# Patient Record
Sex: FEMALE | Race: WHITE
[De-identification: names, ages, dates, MRNs, and addresses within clinical notes are randomized per-mention and may not be internally consistent; named-entity substitution may affect disease eponyms.]

---

## 2017-10-08 ENCOUNTER — HOSPITAL ENCOUNTER (EMERGENCY)
Dept: HOSPITAL 63 - ER | Age: 32
Discharge: HOME | End: 2017-10-08
Payer: COMMERCIAL

## 2017-10-08 VITALS — HEIGHT: 68 IN | WEIGHT: 135 LBS | BODY MASS INDEX: 20.46 KG/M2

## 2017-10-08 VITALS — SYSTOLIC BLOOD PRESSURE: 102 MMHG | DIASTOLIC BLOOD PRESSURE: 71 MMHG

## 2017-10-08 DIAGNOSIS — M32.9: ICD-10-CM

## 2017-10-08 DIAGNOSIS — M25.561: Primary | ICD-10-CM

## 2017-10-08 PROCEDURE — 99283 EMERGENCY DEPT VISIT LOW MDM: CPT

## 2017-10-08 NOTE — PHYS DOC
General


Chief Complaint:  KNEE INJURY


Stated Complaint:  KNEE INJURY


Time Seen by MD:  11:06


Source:  patient, family


Exam Limitations:  no limitations


Problems:  





History of Present Illness


Initial Comments


Patient is a 32-year-old female with history of lupus coming to the ED for 

right knee symptoms.


Patient states that she had a steroid injection in her right knee on Thursday 

for what sounds like patellofemoral tracking syndrome. She states she was told 

to seek medical treatment and if she had any "streaking." She says today she 

has increased redness and has 2 small marks on the skin extending laterally 

which could be abrasions she feels as streaking. She complains of increased 

tenderness at the injection site however has no discomfort with active and 

passive full range of motion of the right knee. No fever chills sweats or body 

aches.  Ambulates w/o limp, pt and her spouse are very concerned.  ED VS normal


Onset:  other (3 days ago)


Severity:  mild


Pain/Injury Location:  right knee


Method of Injury:  other


Modifying Factors:  improves with other


Allergies:  


Coded Allergies:  


     No Known Drug Allergies (Unverified , 10/8/17)





Past Medical History


Medical History:  other (SLE)


Surgical History:  no surgical history





Social History


Smoker:  non-smoker


Alcohol:  none


Drugs:  none





Review of Systems


Constitutional:  denies chills, denies diaphoresis, denies fever, denies malaise


Respiratory:  denies cough, denies shortness of breath


Cardiovascular:  denies chest pain, denies palpitations


Gastrointestinal:  denies nausea, denies vomiting


Musculoskeletal:  see HPI


Skin:  see HPI


Psychiatric/Neurological:  denies numbness, denies paresthesia, denies weakness





Physical Exam


General Appearance:  WD/WN, no apparent distress


Neck:  non-tender, full range of motion, supple


Cardiovascular/Respiratory:  normal peripheral pulses, no respiratory distress


Back:  no CVA tenderness, no vertebral tenderness


Knees:  right knee normal range of motion, right knee no evidence of injury, 

right knee other (mild prepatellar bursal swelling and tenderness, ligaments 

intact, injection site 0.3cm circular erythema with mild tenderness appears 

normal, two thin 1cm lines extending laterally no bony TTP)


Neurologic/Tendon:  normal sensation, normal motor functions, normal tendon 

functions, responds to pain, no evidence tendon injury


Psychiatric:  alert, oriented x 3


Skin:  warm/dry (R knee as above)





Departure


Time of Disposition:  12:09


Disposition:  01 HOME, SELF-CARE


Diagnosis:  right knee pain


Condition:  GOOD


Patient Instructions:  RICE - Routine Care for Injuries, Easy-to-Read





Additional Instructions:  


RICE, see handout.


As discussed we'll treat with doxycycline to poole off any possible infection 

and prednisone to decrease swelling.


Zofran ODT for nausea as needed.


Follow-up with your doctor in 3-5 days for recheck.


Return to ED with new or changing symptoms.











LINDSEY RODRIGUEZ DO Oct 8, 2017 12:11

## 2017-11-09 ENCOUNTER — HOSPITAL ENCOUNTER (INPATIENT)
Dept: HOSPITAL 63 - 1 SOUTH | Age: 32
LOS: 1 days | Discharge: ANOTHER HOSPITAL | DRG: 392 | End: 2017-11-10
Attending: FAMILY MEDICINE | Admitting: FAMILY MEDICINE
Payer: COMMERCIAL

## 2017-11-09 VITALS — WEIGHT: 143.02 LBS | BODY MASS INDEX: 21.67 KG/M2 | HEIGHT: 68 IN

## 2017-11-09 VITALS — SYSTOLIC BLOOD PRESSURE: 111 MMHG | DIASTOLIC BLOOD PRESSURE: 71 MMHG

## 2017-11-09 VITALS — SYSTOLIC BLOOD PRESSURE: 113 MMHG | DIASTOLIC BLOOD PRESSURE: 72 MMHG

## 2017-11-09 VITALS — SYSTOLIC BLOOD PRESSURE: 100 MMHG | DIASTOLIC BLOOD PRESSURE: 62 MMHG

## 2017-11-09 VITALS — DIASTOLIC BLOOD PRESSURE: 66 MMHG | SYSTOLIC BLOOD PRESSURE: 107 MMHG

## 2017-11-09 VITALS — DIASTOLIC BLOOD PRESSURE: 71 MMHG | SYSTOLIC BLOOD PRESSURE: 111 MMHG

## 2017-11-09 DIAGNOSIS — Z87.891: ICD-10-CM

## 2017-11-09 DIAGNOSIS — R31.0: ICD-10-CM

## 2017-11-09 DIAGNOSIS — K52.9: Primary | ICD-10-CM

## 2017-11-09 DIAGNOSIS — G57.93: ICD-10-CM

## 2017-11-09 DIAGNOSIS — K92.1: ICD-10-CM

## 2017-11-09 DIAGNOSIS — Z90.89: ICD-10-CM

## 2017-11-09 DIAGNOSIS — M32.8: ICD-10-CM

## 2017-11-09 LAB
ALBUMIN SERPL-MCNC: 3.6 G/DL (ref 3.4–5)
ALBUMIN/GLOB SERPL: 1.2 {RATIO} (ref 1–1.7)
ALP SERPL-CCNC: 74 U/L (ref 46–116)
ALT SERPL-CCNC: 26 U/L (ref 14–59)
ANION GAP SERPL CALC-SCNC: 7 MMOL/L (ref 6–14)
APTT PPP: YELLOW S
AST SERPL-CCNC: 17 U/L (ref 15–37)
BACTERIA #/AREA URNS HPF: (no result) /HPF
BASOPHILS # BLD AUTO: 0 X10^3/UL (ref 0–0.2)
BASOPHILS NFR BLD: 1 % (ref 0–3)
BILIRUB SERPL-MCNC: 0.3 MG/DL (ref 0.2–1)
BILIRUB UR QL STRIP: (no result)
BUN/CREAT SERPL: 13 (ref 6–20)
CA-I SERPL ISE-MCNC: 9 MG/DL (ref 7–20)
CALCIUM SERPL-MCNC: 8.4 MG/DL (ref 8.5–10.1)
CHLORIDE SERPL-SCNC: 107 MMOL/L (ref 98–107)
CK SERPL-CCNC: 61 U/L (ref 26–192)
CO2 SERPL-SCNC: 27 MMOL/L (ref 21–32)
CREAT SERPL-MCNC: 0.7 MG/DL (ref 0.6–1)
CRP SERPL-MCNC: 0.7 MG/L (ref 0–3.3)
EOSINOPHIL NFR BLD: 0.2 X10^3/UL (ref 0–0.7)
EOSINOPHIL NFR BLD: 4 % (ref 0–3)
ERYTHROCYTE [DISTWIDTH] IN BLOOD BY AUTOMATED COUNT: 14.1 % (ref 11.5–14.5)
ERYTHROCYTE [SEDIMENTATION RATE] IN BLOOD: 4 MM/H (ref 0–25)
FIBRINOGEN PPP-MCNC: (no result) MG/DL
GFR SERPLBLD BASED ON 1.73 SQ M-ARVRAT: 97 ML/MIN
GLOBULIN SER-MCNC: 2.9 G/DL (ref 2.2–3.8)
GLUCOSE SERPL-MCNC: 84 MG/DL (ref 70–99)
GLUCOSE UR STRIP-MCNC: (no result) MG/DL
HCT VFR BLD CALC: 35.2 % (ref 36–47)
HGB BLD-MCNC: 12 G/DL (ref 12–15.5)
LYMPHOCYTES # BLD: 1.3 X10^3/UL (ref 1–4.8)
LYMPHOCYTES NFR BLD AUTO: 32 % (ref 24–48)
MCH RBC QN AUTO: 30 PG (ref 25–35)
MCHC RBC AUTO-ENTMCNC: 34 G/DL (ref 31–37)
MCV RBC AUTO: 88 FL (ref 79–100)
MONO #: 0.5 X10^3/UL (ref 0–1.1)
MONOCYTES NFR BLD: 11 % (ref 0–9)
NEUT #: 2.2 X10^3UL (ref 1.8–7.7)
NEUTROPHILS NFR BLD AUTO: 52 % (ref 31–73)
NITRITE UR QL STRIP: (no result)
PLATELET # BLD AUTO: 199 X10^3/UL (ref 140–400)
POTASSIUM SERPL-SCNC: 3.8 MMOL/L (ref 3.5–5.1)
PROT SERPL-MCNC: 6.5 G/DL (ref 6.4–8.2)
RBC # BLD AUTO: 3.98 X10^6/UL (ref 3.5–5.4)
RBC #/AREA URNS HPF: (no result) /HPF (ref 0–2)
SODIUM SERPL-SCNC: 141 MMOL/L (ref 136–145)
SP GR UR STRIP: 1.01
SQUAMOUS #/AREA URNS LPF: (no result) /LPF
U PREG PATIENT: NEGATIVE
UROBILINOGEN UR-MCNC: 0.2 MG/DL
WBC # BLD AUTO: 4.2 X10^3/UL (ref 4–11)
WBC #/AREA URNS HPF: (no result) /HPF (ref 0–4)

## 2017-11-09 PROCEDURE — 80053 COMPREHEN METABOLIC PANEL: CPT

## 2017-11-09 PROCEDURE — 85025 COMPLETE CBC W/AUTO DIFF WBC: CPT

## 2017-11-09 PROCEDURE — 85651 RBC SED RATE NONAUTOMATED: CPT

## 2017-11-09 PROCEDURE — 36415 COLL VENOUS BLD VENIPUNCTURE: CPT

## 2017-11-09 PROCEDURE — 81001 URINALYSIS AUTO W/SCOPE: CPT

## 2017-11-09 PROCEDURE — 83605 ASSAY OF LACTIC ACID: CPT

## 2017-11-09 PROCEDURE — 82550 ASSAY OF CK (CPK): CPT

## 2017-11-09 PROCEDURE — 74176 CT ABD & PELVIS W/O CONTRAST: CPT

## 2017-11-09 PROCEDURE — 81025 URINE PREGNANCY TEST: CPT

## 2017-11-09 PROCEDURE — 86140 C-REACTIVE PROTEIN: CPT

## 2017-11-09 PROCEDURE — 76770 US EXAM ABDO BACK WALL COMP: CPT

## 2017-11-09 PROCEDURE — 87040 BLOOD CULTURE FOR BACTERIA: CPT

## 2017-11-09 PROCEDURE — 87086 URINE CULTURE/COLONY COUNT: CPT

## 2017-11-09 RX ADMIN — HYDROXYCHLOROQUINE SULFATE SCH MG: 200 TABLET, FILM COATED ORAL at 20:58

## 2017-11-09 RX ADMIN — SODIUM CHLORIDE SCH MLS/HR: 0.9 INJECTION, SOLUTION INTRAVENOUS at 15:15

## 2017-11-09 RX ADMIN — ONDANSETRON PRN MG: 4 TABLET, ORALLY DISINTEGRATING ORAL at 16:47

## 2017-11-09 NOTE — RAD
EXAM: CT abdomen/pelvis without contrast.



HISTORY: Pyelonephritis, hematuria. Bloody stools. Abdominal pain.



TECHNIQUE: Computed tomography of the abdomen and pelvis was performed without

intravenous contrast.



COMPARISON: Today's ultrasound.



FINDINGS: Lung windows through the visualized portions of the bases reveal no

abnormality. Bone windows reveal no suspicious lesions.



The kidneys are unremarkable without contrast. There are no renal or ureteral

calculi. There is no hydronephrosis or perinephric stranding.



The gallbladder is surgically absent. The liver, spleen, adrenal glands, and

pancreas are unremarkable. There are no pathologically enlarged lymph nodes.



A small amount of free pelvic fluid is likely physiologic. The uterus and

ovaries are unremarkable by noncontrast CT. There is no clear bowel wall

thickening. The appendix is not inflamed.



IMPRESSION: 

1. No nephroureterolithiasis. No hydronephrosis.

2. No evidence of colitis by CT, though sensitivity is low.





*One or more of the following individualized dose reduction techniques were

utilized for this examination:  

1. Automated exposure control.  

2. Adjustment of the mA and/or kV according to patient size.  

3. Use of iterative reconstruction technique.

## 2017-11-09 NOTE — RAD
EXAM: Renal/retroperitonal ultrasound



HISTORY: Bilateral flank pain.



COMPARISON: Today's CT.



FINDINGS: Ultrasound of the kidneys, bladder and retroperitoneum was

performed.



The right kidney measures 10.6 cm. Cortical thickness and echogenicity are

preserved. The left kidney measures 10.2 cm. Cortical thickness and

echogenicity are preserved. There are small extrarenal pelves bilaterally.    



Images of the bladder reveal no gross abnormality. Both ureteral jets are

visualized.



IMPRESSION:

1. Unremarkable examination of the kidneys. No hydronephrosis.

## 2017-11-10 VITALS — SYSTOLIC BLOOD PRESSURE: 96 MMHG | DIASTOLIC BLOOD PRESSURE: 65 MMHG

## 2017-11-10 VITALS — DIASTOLIC BLOOD PRESSURE: 62 MMHG | SYSTOLIC BLOOD PRESSURE: 100 MMHG

## 2017-11-10 RX ADMIN — ONDANSETRON PRN MG: 4 TABLET, ORALLY DISINTEGRATING ORAL at 06:27

## 2017-11-10 RX ADMIN — SODIUM CHLORIDE SCH MLS/HR: 0.9 INJECTION, SOLUTION INTRAVENOUS at 06:34

## 2017-11-10 RX ADMIN — HYDROXYCHLOROQUINE SULFATE SCH MG: 200 TABLET, FILM COATED ORAL at 08:52

## 2017-11-10 NOTE — DS
DATE OF DISCHARGE:  11/10/2017



HOSPITAL COURSE:  A 32-year-old female for the last 2 weeks has been having pain

in her right mid quadrant area.  She was noted to have some hematuria; although,

her CAT scan really does not show anything in particular as far as any evidences

stones or pyelonephritis.  However, on exam, she does have pain in her right mid

quadrant area, fair tenderness and slight guarding.  The patient has been having

loose stools here for the last of weeks as well, but the pain has got

progressively worst and the patient came into the office for further evaluation

of this pain, which was debilitating enough to the point where she could not get

relief with just oral medications.  As a result of this, the patient was

admitted and placed on IV fluids, and she will be transferred down to [facility] for GI specialist since not at here.  She will be on clear liquid

diet and monitor accordingly and have GI Medicine evaluated for a possible

colitis.  She was taking tramadol for pain.



IMPRESSION:  Abdominal pain, probable colitis, mild hematuria.





______________________________

NANO EDGAR MD



DR:  WESLY/tiffany  JOB#:  [id number] / [id number]

DD:  11/10/2017 10:01  DT:  11/10/2017 20:26

## 2017-11-11 VITALS
DIASTOLIC BLOOD PRESSURE: 55 MMHG | SYSTOLIC BLOOD PRESSURE: 91 MMHG | SYSTOLIC BLOOD PRESSURE: 91 MMHG | DIASTOLIC BLOOD PRESSURE: 55 MMHG

## 2017-12-04 ENCOUNTER — HOSPITAL ENCOUNTER (EMERGENCY)
Dept: HOSPITAL 63 - ER | Age: 32
Discharge: HOME | End: 2017-12-04
Payer: COMMERCIAL

## 2017-12-04 VITALS — DIASTOLIC BLOOD PRESSURE: 50 MMHG | SYSTOLIC BLOOD PRESSURE: 99 MMHG

## 2017-12-04 DIAGNOSIS — Z88.0: ICD-10-CM

## 2017-12-04 DIAGNOSIS — Z88.5: ICD-10-CM

## 2017-12-04 DIAGNOSIS — M25.561: ICD-10-CM

## 2017-12-04 DIAGNOSIS — G89.18: Primary | ICD-10-CM

## 2017-12-04 PROCEDURE — 96372 THER/PROPH/DIAG INJ SC/IM: CPT

## 2017-12-04 PROCEDURE — 99283 EMERGENCY DEPT VISIT LOW MDM: CPT

## 2017-12-04 NOTE — PHYS DOC
General


Chief Complaint:  POST-OP PROBLEM


Stated Complaint:  POST OP PROBLEM


Time Seen by MD:  18:57


Source:  patient


Exam Limitations:  no limitations


Problems:  





History of Present Illness


Initial Comments


Patient is a 32-year-old female who comes to the ED for postoperative pain.


Patient states that she had a right knee arthroscopy 2 days ago Dr. Brown was 

orthopedic surgeon.  She was supposed to leave the compression dressing on for 

3 days however felt that it was too tight this morning. She removed the 

dressing earlier this morning and has had pain throughout the day. Her knee has 

been painful since the procedure and she is allergic to most oral opiate 

medications. She does tolerate morphine and Dilaudid, she's been trying to get 

by on tramadol at home tonight the pain was so great she called the nurse and I 

recommended coming to the emergency department. On arrival her vital signs are 

normal she is able to ambulate on crutches and denies any fever chills sweats 

or body aches.


Onset:  other


Severity:  severe


Pain/Injury Location:  right knee


Method of Injury:  other


Modifying Factors:  worse with jarring, worse with movement, improves with pain 

medication


Allergies:  


Coded Allergies:  


     Penicillins (Verified  Allergy, Unknown, 12/4/17)


     hydrocodone (Verified  Allergy, Unknown, 12/4/17)


     oxycodone (Verified  Allergy, Unknown, 12/4/17)





Past Medical History


Medical History:  other (SLE, )


Surgical History:  no surgical history





Social History


Smoker:  non-smoker


Alcohol:  none


Drugs:  none





Review of Systems


Constitutional:  denies chills, denies diaphoresis, denies fever


Respiratory:  denies cough, denies shortness of breath


Cardiovascular:  denies chest pain, denies palpitations, denies syncope


Gastrointestinal:  denies abdominal pain, denies diarrhea, denies nausea, 

denies vomiting


Genitourinary:  denies dysuria, denies frequency, denies hematuria, denies pain


Musculoskeletal:  see HPI


Skin:  denies change in color, denies lumps, denies rash


Psychiatric/Neurological:  denies headache, denies numbness, denies paresthesia

, denies weakness





Physical Exam


General Appearance:  WD/WN, no apparent distress


Neck:  non-tender, supple


Cardiovascular/Respiratory:  normal peripheral pulses, no respiratory distress


Back:  no CVA tenderness, no vertebral tenderness


Knees:  right knee other (2+ effusion, 2 port incisions clean and dry without 

erythema or discharge and knee is appropriately tender no pain with passive 

range of motion no extraordinary warmth or erythema, no cellulitic skin changes 

no bony tenderness)


Neurologic/Tendon:  normal sensation, normal motor functions, normal tendon 

functions, no evidence tendon injury


Psychiatric:  alert, oriented x 3


Skin:  normal color, warm/dry





Orders, Labs, Meds


Patient rechecked her pain is resolved. Her vital signs remained stable. I 

discussed signs and symptoms to monitor as well as indications for urgent 

return to the department. I discussed ongoing postoperative care and close 

follow-up with orthopedic surgeon. Her questions were answered to her 

satisfaction and she expressed agreement and understanding of the treatment 

plan.


Departure


Time of Disposition:  19:37


Disposition:  01 HOME, SELF-CARE


Diagnosis:  postoperative right knee pain


Condition:  GOOD


Patient Instructions:  Pain Relief Preoperatively and Postoperatively





Additional Instructions:  


Continue postoperative instructions given by your orthopedic surgeon.


Keep her knee elevated as much as possible and try to stay off of it.


Call surgeons office tomorrow to notify of tonight's visit. Follow up with him 

as directed.


Return to ED with new or changing symptoms.











LINDSEY RODRIGUEZ DO Dec 4, 2017 19:39

## 2018-09-01 ENCOUNTER — HOSPITAL ENCOUNTER (EMERGENCY)
Dept: HOSPITAL 63 - ER | Age: 33
Discharge: HOME | End: 2018-09-01
Payer: COMMERCIAL

## 2018-09-01 VITALS
SYSTOLIC BLOOD PRESSURE: 117 MMHG | WEIGHT: 128 LBS | HEIGHT: 68 IN | BODY MASS INDEX: 19.4 KG/M2 | DIASTOLIC BLOOD PRESSURE: 65 MMHG

## 2018-09-01 DIAGNOSIS — Z88.0: ICD-10-CM

## 2018-09-01 DIAGNOSIS — Z88.5: ICD-10-CM

## 2018-09-01 DIAGNOSIS — M77.02: Primary | ICD-10-CM

## 2018-09-01 PROCEDURE — 99283 EMERGENCY DEPT VISIT LOW MDM: CPT

## 2018-09-01 PROCEDURE — 99282 EMERGENCY DEPT VISIT SF MDM: CPT

## 2018-09-01 NOTE — PHYS DOC
Past History


Past Medical History:  Other


Past Surgical History:  No Surgical History


Alcohol Use:  None


Drug Use:  None





Adult General


Chief Complaint


Chief Complaint:  HAND PROBLEM





HPI


HPI





33-year-old female presents with some pain from the medial aspect of her elbow 

that radiates down into her hand and she feels as if her little finger is numb. 

She states this started after repetitive movements lifting  stones. She 

denies any other injuries. She also states the pain is much worse when she 

supinates and pronates her forearm and flexes and extends at the elbow.[]





Review of Systems


Review of Systems


[]





All other systems were reviewed and found to be within normal limits, except as 

documented in this note.





Current Medications


Current Medications





Current Medications








 Medications


  (Trade)  Dose


 Ordered  Sig/Anam  Start Time


 Stop Time Status Last Admin


Dose Admin


 


 Ibuprofen


  (Motrin)  600 mg  1X  ONCE  9/1/18 16:30


 9/1/18 16:31 UNV  














Allergies


Allergies





Allergies








Coded Allergies Type Severity Reaction Last Updated Verified


 


  Penicillins Allergy Unknown  12/4/17 Yes


 


  hydrocodone Allergy Unknown  12/4/17 Yes


 


  oxycodone Allergy Unknown  12/4/17 Yes











Physical Exam


Physical Exam





Constitutional: Well developed, well nourished, no acute distress, non-toxic 

appearance. []


HENT: Normocephalic, atraumatic, bilateral external ears normal, oropharynx 

moist, no oral exudates, nose normal. []


Eyes: PERRLA, EOMI, conjunctiva normal, no discharge. [] 


Neck: Normal range of motion, no tenderness, supple, no stridor. [] 


Cardiovascular:Heart rate regular rhythm, no murmur []


Lungs & Thorax:  Bilateral breath sounds clear to auscultation []


Abdomen: Bowel sounds normal, soft, no tenderness, no masses, no pulsatile 

masses. [] 


Skin: Warm, dry, no erythema, no rash. [] 


Back: No tenderness, no CVA tenderness. [] 


Extremities: Medial epicondyle of the left elbow is mildly tender to palp no 

obvious deformity] 


Neurologic: Alert and oriented X 3, normal motor function, normal sensory 

function, no focal deficits noted. []


Psychologic: Affect normal, judgement normal, mood normal. []





EKG


EKG


[]





Radiology/Procedures


Radiology/Procedures


[]





Course & Med Decision Making


Course & Med Decision Making


Pertinent Labs and Imaging studies reviewed. (See chart for details)





[]





Dragon Disclaimer


Dragon Disclaimer


This electronic medical record was generated, in whole or in part, using a 

voice recognition dictation system.





Departure


Departure:


Impression:  


 Primary Impression:  


 Medial epicondylitis of left elbow


Disposition:  01 HOME, SELF-CARE


Condition:  STABLE


Referrals:  


NANO EDGAR MD (PCP)


Patient Instructions:  Epicondylitis, Medial (Golfer's Elbow) with Rehab-

[facility]





Additional Instructions:  


Return to emergency with any new or concerning symptoms


Scripts


Naproxen (NAPROXEN) 500 Mg Tablet.dr


1 TAB PO Q12HR PRN for PAIN, #60 TAB 1 Refill


   Prov: GARY MATTHEW DO         9/1/18











GARY MATTHEW DO Sep 1, 2018 16:28

## 2018-09-27 ENCOUNTER — HOSPITAL ENCOUNTER (EMERGENCY)
Dept: HOSPITAL 63 - ER | Age: 33
LOS: 1 days | Discharge: HOME | End: 2018-09-28
Payer: COMMERCIAL

## 2018-09-27 VITALS — BODY MASS INDEX: 18.94 KG/M2 | HEIGHT: 68 IN | WEIGHT: 125 LBS

## 2018-09-27 DIAGNOSIS — R42: Primary | ICD-10-CM

## 2018-09-27 DIAGNOSIS — Z88.0: ICD-10-CM

## 2018-09-27 DIAGNOSIS — R51: ICD-10-CM

## 2018-09-27 DIAGNOSIS — R11.2: ICD-10-CM

## 2018-09-27 DIAGNOSIS — Z88.5: ICD-10-CM

## 2018-09-27 LAB
ALBUMIN SERPL-MCNC: 3.7 G/DL (ref 3.4–5)
ALBUMIN/GLOB SERPL: 1.4 {RATIO} (ref 1–1.7)
ALP SERPL-CCNC: 83 U/L (ref 46–116)
ALT SERPL-CCNC: 21 U/L (ref 14–59)
ANION GAP SERPL CALC-SCNC: 8 MMOL/L (ref 6–14)
APTT PPP: (no result) S
AST SERPL-CCNC: 14 U/L (ref 15–37)
BACTERIA #/AREA URNS HPF: 0 /HPF
BASOPHILS # BLD AUTO: 0.1 X10^3/UL (ref 0–0.2)
BASOPHILS NFR BLD: 1 % (ref 0–3)
BILIRUB SERPL-MCNC: 0.3 MG/DL (ref 0.2–1)
BILIRUB UR QL STRIP: (no result)
BUN/CREAT SERPL: 16 (ref 6–20)
CA-I SERPL ISE-MCNC: 11 MG/DL (ref 7–20)
CALCIUM SERPL-MCNC: 8.8 MG/DL (ref 8.5–10.1)
CHLORIDE SERPL-SCNC: 106 MMOL/L (ref 98–107)
CO2 SERPL-SCNC: 26 MMOL/L (ref 21–32)
CREAT SERPL-MCNC: 0.7 MG/DL (ref 0.6–1)
EOSINOPHIL NFR BLD: 0.1 X10^3/UL (ref 0–0.7)
EOSINOPHIL NFR BLD: 2 % (ref 0–3)
ERYTHROCYTE [DISTWIDTH] IN BLOOD BY AUTOMATED COUNT: 14.5 % (ref 11.5–14.5)
FIBRINOGEN PPP-MCNC: CLEAR MG/DL
GFR SERPLBLD BASED ON 1.73 SQ M-ARVRAT: 96.4 ML/MIN
GLOBULIN SER-MCNC: 2.7 G/DL (ref 2.2–3.8)
GLUCOSE SERPL-MCNC: 79 MG/DL (ref 70–99)
GLUCOSE UR STRIP-MCNC: (no result) MG/DL
HCT VFR BLD CALC: 35.1 % (ref 36–47)
HGB BLD-MCNC: 11.6 G/DL (ref 12–15.5)
LIPASE: 291 U/L (ref 73–393)
LYMPHOCYTES # BLD: 2.2 X10^3/UL (ref 1–4.8)
LYMPHOCYTES NFR BLD AUTO: 26 % (ref 24–48)
MCH RBC QN AUTO: 29 PG (ref 25–35)
MCHC RBC AUTO-ENTMCNC: 33 G/DL (ref 31–37)
MCV RBC AUTO: 88 FL (ref 79–100)
MONO #: 0.6 X10^3/UL (ref 0–1.1)
MONOCYTES NFR BLD: 8 % (ref 0–9)
NEUT #: 5.3 X10^3UL (ref 1.8–7.7)
NEUTROPHILS NFR BLD AUTO: 64 % (ref 31–73)
NITRITE UR QL STRIP: (no result)
PLATELET # BLD AUTO: 217 X10^3/UL (ref 140–400)
POTASSIUM SERPL-SCNC: 3.7 MMOL/L (ref 3.5–5.1)
PREG TEST PT QUAL: NEGATIVE
PROT SERPL-MCNC: 6.4 G/DL (ref 6.4–8.2)
RBC # BLD AUTO: 3.97 X10^6/UL (ref 3.5–5.4)
RBC #/AREA URNS HPF: 0 /HPF (ref 0–2)
SODIUM SERPL-SCNC: 140 MMOL/L (ref 136–145)
SP GR UR STRIP: <=1.005
SQUAMOUS #/AREA URNS LPF: (no result) /LPF
UROBILINOGEN UR-MCNC: 0.2 MG/DL
WBC # BLD AUTO: 8.4 X10^3/UL (ref 4–11)
WBC #/AREA URNS HPF: 0 /HPF (ref 0–4)

## 2018-09-27 PROCEDURE — 96374 THER/PROPH/DIAG INJ IV PUSH: CPT

## 2018-09-27 PROCEDURE — 70450 CT HEAD/BRAIN W/O DYE: CPT

## 2018-09-27 PROCEDURE — 80053 COMPREHEN METABOLIC PANEL: CPT

## 2018-09-27 PROCEDURE — 81001 URINALYSIS AUTO W/SCOPE: CPT

## 2018-09-27 PROCEDURE — 36415 COLL VENOUS BLD VENIPUNCTURE: CPT

## 2018-09-27 PROCEDURE — S0028 INJECTION, FAMOTIDINE, 20 MG: HCPCS

## 2018-09-27 PROCEDURE — 84703 CHORIONIC GONADOTROPIN ASSAY: CPT

## 2018-09-27 PROCEDURE — 99285 EMERGENCY DEPT VISIT HI MDM: CPT

## 2018-09-27 PROCEDURE — 83690 ASSAY OF LIPASE: CPT

## 2018-09-27 PROCEDURE — 96361 HYDRATE IV INFUSION ADD-ON: CPT

## 2018-09-27 PROCEDURE — 70496 CT ANGIOGRAPHY HEAD: CPT

## 2018-09-27 PROCEDURE — 70498 CT ANGIOGRAPHY NECK: CPT

## 2018-09-27 PROCEDURE — 96375 TX/PRO/DX INJ NEW DRUG ADDON: CPT

## 2018-09-27 PROCEDURE — 85025 COMPLETE CBC W/AUTO DIFF WBC: CPT

## 2018-09-27 NOTE — PHYS DOC
Past History


Past Medical History:  Other


Past Surgical History:  Cholecystectomy, , Other


Alcohol Use:  None


Drug Use:  None





Adult General


Chief Complaint


Chief Complaint:  NAUSEA/VOMITING/DIARRHEA





HPI


HPI





Patient is a 33 year old female who presents with complaint of headache, 

dizziness, and vomiting. Patient states that over the past 3 days she has been 

having problems with tremors, which she admits has been able recurrent problem. 

Patient however states that this evening at around 2000 she suddenly became 

very dizzy and had headache. She states that she felt like she was going to 

fall down and states that she had associated nausea and vomiting. Patient was 

brought to the emergency department for further evaluation by her significant 

other. The patient states that currently her headache is 5 out of 10. The 

patient states that she feels dizzy, even while laying down in the bed. She 

states that it does worsen when she tries to stand up. Denies any abdominal 

pain or fever. Denies any focal weakness.





Review of Systems


Review of Systems





Constitutional: Denies fever or chills []


Eyes: Denies change in visual acuity, redness, or eye pain []


HENT: Denies nasal congestion or sore throat []


Respiratory: Denies cough or shortness of breath []


Cardiovascular: Denies chest pain or edema[]


GI: Nausea, vomiting, denies abdominal pain, bloody stools or diarrhea []


: Denies dysuria or hematuria []


Musculoskeletal: Denies back pain or joint pain []


Integument: Denies rash or skin lesions []


Neurologic: Headache, dizziness, tremors, denies focal weakness[]








All other systems were reviewed and found to be within normal limits, except as 

documented in this note.





Current Medications


Current Medications





Current Medications








 Medications


  (Trade)  Dose


 Ordered  Sig/[facility]  Start Time


 Stop Time Status Last Admin


Dose Admin


 


 Famotidine


  (Pepcid Vial)  20 mg  1X  ONCE  18 22:00


 18 22:01   





 


 Fentanyl Citrate


  (Fentanyl 2ml


 Vial)  50 mcg  PRN Q15MIN  PRN  18 21:15


 18 21:14   





 


 Ondansetron HCl


  (Zofran)  4 mg  1X  ONCE  18 22:00


 18 22:01   





 


 Sodium Chloride  1,000 ml @ 


 1,000 mls/hr  Q1H  18 22:00


 18 22:59   














Allergies


Allergies





Allergies








Coded Allergies Type Severity Reaction Last Updated Verified


 


  Penicillins Allergy Unknown  17 Yes


 


  hydrocodone Allergy Unknown  17 Yes


 


  oxycodone Allergy Unknown  17 Yes











Physical Exam


Physical Exam





Constitutional: Alert, afebrile, vital signs stable, appears in mild to 

moderate discomfort. []


HENT: Normocephalic, atraumatic, bilateral external ears normal, oropharynx 

moist, no oral exudates, nose normal. []


Eyes: PERRLA, EOMI, bilateral horizontal nystagmus present, conjunctiva normal, 

no discharge. [] 


Neck: Normal range of motion, no tenderness, supple, no stridor. [] 


Cardiovascular:Heart rate regular rhythm, no murmur []


Lungs & Thorax:  Bilateral breath sounds clear to auscultation []


Abdomen: Bowel sounds normal, soft, no tenderness, no masses, no pulsatile 

masses. [] 


Skin: Warm, dry, no erythema, no rash. [] 


Back: No tenderness, no CVA tenderness. [] 


Extremities: No tenderness, no cyanosis, no clubbing, ROM intact, no edema. [] 


Neurologic: Alert and oriented X 3, normal motor function, normal sensory 

function, no focal deficits noted. []





Current Patient Data


Vital Signs





 Vital Signs








  Date Time  Temp Pulse Resp B/P (MAP) Pulse Ox O2 Delivery O2 Flow Rate FiO2


 


18 20:52 97.9 62 22  100 Room Air  








Lab Results





Laboratory Tests








Test


 18


21:20 18


21:30 18


22:35


 


Serum Pregnancy Test,


Qualitative Negative 


 


 





 


White Blood Count  8.4 x10^3/uL  


 


Red Blood Count  3.97 x10^6/uL  


 


Hemoglobin  11.6 g/dL  


 


Hematocrit  35.1 %  


 


Mean Corpuscular Volume  88 fL  


 


Mean Corpuscular Hemoglobin  29 pg  


 


Mean Corpuscular Hemoglobin


Concent 


 33 g/dL 


 





 


Red Cell Distribution Width  14.5 %  


 


Platelet Count  217 x10^3/uL  


 


Neutrophils (%) (Auto)  64 %  


 


Lymphocytes (%) (Auto)  26 %  


 


Monocytes (%) (Auto)  8 %  


 


Eosinophils (%) (Auto)  2 %  


 


Basophils (%) (Auto)  1 %  


 


Neutrophils # (Auto)  5.3 x10^3uL  


 


Lymphocytes # (Auto)  2.2 x10^3/uL  


 


Monocytes # (Auto)  0.6 x10^3/uL  


 


Eosinophils # (Auto)  0.1 x10^3/uL  


 


Basophils # (Auto)  0.1 x10^3/uL  


 


Sodium Level  140 mmol/L  


 


Potassium Level  3.7 mmol/L  


 


Chloride Level  106 mmol/L  


 


Carbon Dioxide Level  26 mmol/L  


 


Anion Gap  8  


 


Blood Urea Nitrogen  11 mg/dL  


 


Creatinine  0.7 mg/dL  


 


Estimated GFR


(Cockcroft-Gault) 


 96.4 


 





 


BUN/Creatinine Ratio  16  


 


Glucose Level  79 mg/dL  


 


Calcium Level  8.8 mg/dL  


 


Total Bilirubin  0.3 mg/dL  


 


Aspartate Amino Transf


(AST/SGOT) 


 14 U/L 


 





 


Alanine Aminotransferase


(ALT/SGPT) 


 21 U/L 


 





 


Alkaline Phosphatase  83 U/L  


 


Total Protein  6.4 g/dL  


 


Albumin  3.7 g/dL  


 


Albumin/Globulin Ratio  1.4  


 


Lipase  291 U/L  


 


Urine Collection Type   Unknown 


 


Urine Color   Straw 


 


Urine Clarity   Clear 


 


Urine pH   7.0 


 


Urine Specific Gravity   <=1.005 


 


Urine Protein   Neg 


 


Urine Glucose (UA)   Neg mg/dL 


 


Urine Ketones (Stick)   Neg mg/dL 


 


Urine Blood   Neg 


 


Urine Nitrite   Neg 


 


Urine Bilirubin   Neg 


 


Urine Urobilinogen Dipstick   0.2 mg/dL 


 


Urine Leukocyte Esterase   Neg 


 


Urine RBC   0 /HPF 


 


Urine WBC   0 /HPF 


 


Urine Squamous Epithelial


Cells 


 


 Occ /LPF 





 


Urine Bacteria   0 /HPF 








Current Medications








 Medications


  (Trade)  Dose


 Ordered  Sig/Anam


 Route


 PRN Reason  Start Time


 Stop Time Status Last Admin


Dose Admin


 


 Fentanyl Citrate


  (Fentanyl 2ml


 Vial)  50 mcg  PRN Q15MIN  PRN


 IV


 PAIN GREATER THAN 3/10  18 21:15


 18 21:14  18 21:44





 


 Sodium Chloride  1,000 ml @ 


 1,000 mls/hr  Q1H


 IV


   18 22:00


 18 22:59 DC 18 21:43





 


 Ondansetron HCl


  (Zofran)  4 mg  1X  ONCE


 IV


   18 22:00


 18 22:01 DC 18 21:43





 


 Famotidine


  (Pepcid Vial)  20 mg  1X  ONCE


 IVP


   18 22:00


 18 22:01 DC 18 21:44





 


 Meclizine HCl


  (Antivert)  50 mg  1X  ONCE


 PO


   18 22:00


 18 22:01 DC  





 


 Iohexol


  (Omnipaque 300


 Mg/ml)  75 ml  1X  ONCE


 IV


   18 22:00


 18 22:07 DC 18 22:13





 


 Sodium Chloride  1,000 ml @ 


 1,000 mls/hr  1X  ONCE


 IV


   18 23:15


 18 00:14  18 23:20














EKG


EKG


Not performed[]





Radiology/Procedures


Radiology/Procedures


[facility] [address], [place] [address] [phone]


 


 IMAGING REPORT





 Signed





PATIENT: VISHAL ALCARAZ ACCOUNT: [id number] MRN#: [id number]


: 1985 LOCATION: ER AGE: 33


SEX: F EXAM DT: 18 ACCESSION#: [id number]


STATUS: REG ER ORD. PHYSICIAN: KAILA FRAZIER MD 


REASON: headache, dizziness


PROCEDURE: CT HEAD WO CONTRAST





CT HEAD WO CONTRAST


 


History: Dizziness, sudden onset of headache, history of lupus


 


Comparison: None.


 


Technique: Noncontrast CT imaging was performed of the head.  Exposure: 


One or more of the following individualized dose reduction techniques were


utilized for this examination:  1. Automated exposure control  2. 


Adjustment of the mA and/or kV according to patient size  3. Use of 


iterative reconstruction technique.


 


Findings: No acute extra-axial or parenchymal hemorrhage is identified. 


There is no significant intra-axial mass effect, midline shift, or 


extra-axial fluid collection. The gray-white differentiation of the major 


vascular territories is preserved.  The ventricles, sulci, and cisterns 


are within normal limits in size and configuration.   The mastoid air 


cells and the visualized paranasal sinuses are aerated.  No acute 


calvarial abnormality is identified.


 


Impression:


1. No acute intracranial abnormality is identified. 


 


Electronically signed by: Maria Isabel Higgins MD (2018 10:50 PM) [facility]














DICTATED AND SIGNED BY:     MARIA ISABEL HIGGINS MD


DATE:     18





CC: NANO EDGAR MD; KAILA FRAZIER MD ~





[facility] [address], [place] [address] [phone]


 


 IMAGING REPORT





 Signed





PATIENT: VISHAL ALCARAZ ACCOUNT: [id number] MRN#: [id number]


: 1985 LOCATION: ER AGE: 33


SEX: F EXAM DT: 18 ACCESSION#: [id number]


STATUS: REG ER ORD. PHYSICIAN: KAILA FRAZIER MD 


REASON: sudden onset headache, dizziness at 


PROCEDURE: CT ANGIOGRAPHY HEAD AND NECK





 


CTA head and neck


 


History: Sudden onset of severe headache and dizziness, lupus


 


Technique: After bolus of intravenous contrast, volumetric CT data 


acquisition was acquired of the head and neck. Multiplanar reconstruction 


images to include MIP and 3-D reconstruction images are submitted.  


Exposure: One or more of the following individualized dose reduction 


techniques were utilized for this examination:  1. Automated exposure 


control  2. Adjustment of the mA and/or kV according to patient size  3. 


Use of iterative reconstruction technique.


 


Contrast: 75 cc Omnipaque 300


 


Comparison: None


 


Any determination of stenosis is based on NASCET criteria.  


 


CTA head: 


Findings: Both vertebral arteries constitute the basilar artery, dominant 


left vertebral artery. There is visualization of segments bilateral PICAs,


AICAs not well visualized. There is visualization bilateral superior 


cerebellar arteries. There are small bilateral posterior communicating 


arteries. Anterior communicating artery is not well-visualized. There is 


visualization of the internal carotid arteries bilaterally at the skull 


base. There is visualization of the anterior, middle, posterior cerebral 


arteries bilaterally. No focal vessel occlusion/intraluminal filling 


defect is identified. No intracranial aneurysm is identified.


 


Impression:


1.  No intracranial vessel occlusion or aneurysm is identified.


 


Neck CTA:


 


Findings: No significant stenosis or dissection flap is identified of the 


cervical arterial vasculature. There is no abnormality of the visualized 


lung apices.


 


Impression:


1. No significant stenosis or dissection flap is identified of the 


cervical arterial vasculature.


 


Electronically signed by: Maria Isabel Higgins MD (2018 11:06 PM) [facility]














DICTATED AND SIGNED BY:     MARIA ISABEL HIGGINS MD


DATE:     18 2256





CC: NANO EDGAR MD; KAILA FRAZIER MD ~


[]





Course & Med Decision Making


Course & Med Decision Making


Pertinent Labs and Imaging studies reviewed. (See chart for details)





Patient was treated with IV fluids, Zofran, fentanyl, and oral meclizine. 

Patient voices improvement symptoms. CT imaging shows no evidence of acute 

posterior circulation abnormality. Will continue patient on meclizine for 

treatment of vertigo. Advised follow-up with primary doctor in the next 1-2 

days for reevaluation and return to emergency department for any worsening 

symptoms. Patient was understanding and agreement with treatment plan.





Dragon Disclaimer


Dragon Disclaimer


This electronic medical record was generated, in whole or in part, using a 

voice recognition dictation system.





Departure


Departure:


Impression:  


 Primary Impression:  


 Vertigo


Disposition:  01 HOME, SELF-CARE


Condition:  IMPROVED


Referrals:  


NANO EDGAR MD (PCP)


Patient Instructions:  Vertigo





Additional Instructions:  


Follow-up with your primary doctor in 1-2 days for reevaluation. Return to the 

emergency department for any worsening symptoms.


Scripts


Meclizine Hcl (MECLIZINE HCL) 25 Mg Tablet


1 TAB PO TID PRN for DIZZINESS, #30 TAB


   Prov: KAILA FRAZIER MD         18











KAILA FRAZIER MD Sep 27, 2018 21:47

## 2018-09-27 NOTE — RAD
CT HEAD WO CONTRAST

 

History: Dizziness, sudden onset of headache, history of lupus

 

Comparison: None.

 

Technique: Noncontrast CT imaging was performed of the head.  Exposure: 

One or more of the following individualized dose reduction techniques were

utilized for this examination:  1. Automated exposure control  2. 

Adjustment of the mA and/or kV according to patient size  3. Use of 

iterative reconstruction technique.

 

Findings: No acute extra-axial or parenchymal hemorrhage is identified. 

There is no significant intra-axial mass effect, midline shift, or 

extra-axial fluid collection. The gray-white differentiation of the major 

vascular territories is preserved.  The ventricles, sulci, and cisterns 

are within normal limits in size and configuration.   The mastoid air 

cells and the visualized paranasal sinuses are aerated.  No acute 

calvarial abnormality is identified.

 

Impression:

1. No acute intracranial abnormality is identified. 

 

Electronically signed by: Torey Duran MD (9/27/2018 10:50 PM) [facility]

## 2018-09-27 NOTE — RAD
CTA head and neck

 

History: Sudden onset of severe headache and dizziness, lupus

 

Technique: After bolus of intravenous contrast, volumetric CT data 

acquisition was acquired of the head and neck. Multiplanar reconstruction 

images to include MIP and 3-D reconstruction images are submitted.  

Exposure: One or more of the following individualized dose reduction 

techniques were utilized for this examination:  1. Automated exposure 

control  2. Adjustment of the mA and/or kV according to patient size  3. 

Use of iterative reconstruction technique.

 

Contrast: 75 cc Omnipaque 300

 

Comparison: None

 

Any determination of stenosis is based on NASCET criteria.  

 

CTA head: 

Findings: Both vertebral arteries constitute the basilar artery, dominant 

left vertebral artery. There is visualization of segments bilateral PICAs,

AICAs not well visualized. There is visualization bilateral superior 

cerebellar arteries. There are small bilateral posterior communicating 

arteries. Anterior communicating artery is not well-visualized. There is 

visualization of the internal carotid arteries bilaterally at the skull 

base. There is visualization of the anterior, middle, posterior cerebral 

arteries bilaterally. No focal vessel occlusion/intraluminal filling 

defect is identified. No intracranial aneurysm is identified.

 

Impression:

1.  No intracranial vessel occlusion or aneurysm is identified.

 

Neck CTA:

 

Findings: No significant stenosis or dissection flap is identified of the 

cervical arterial vasculature. There is no abnormality of the visualized 

lung apices.

 

Impression:

1. No significant stenosis or dissection flap is identified of the 

cervical arterial vasculature.

 

Electronically signed by: Torey Duran MD (9/27/2018 11:06 PM) [facility]

## 2018-09-28 VITALS
SYSTOLIC BLOOD PRESSURE: 103 MMHG | DIASTOLIC BLOOD PRESSURE: 57 MMHG | SYSTOLIC BLOOD PRESSURE: 103 MMHG | DIASTOLIC BLOOD PRESSURE: 57 MMHG

## 2018-10-25 ENCOUNTER — HOSPITAL ENCOUNTER (OUTPATIENT)
Dept: HOSPITAL 61 - KCIC MRI | Age: 33
Discharge: HOME | End: 2018-10-25
Payer: COMMERCIAL

## 2018-10-25 DIAGNOSIS — M22.41: ICD-10-CM

## 2018-10-25 DIAGNOSIS — S83.241D: Primary | ICD-10-CM

## 2018-10-25 DIAGNOSIS — M25.461: ICD-10-CM
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
## 2018-10-25 PROCEDURE — 73721 MRI JNT OF LWR EXTRE W/O DYE: CPT

## 2018-10-25 NOTE — [facility]
MR of the right knee

 

Indication: Right knee pain, injury October 16. Pain medial.

 

Technique: The standard multiplanar sequences are obtained.

 

FINDINGS:

Artifact: No significant image degradation.

 

Medial meniscus:Intact.

Lateral meniscus: Intact.

 

Anterior cruciate ligament: Intact

Posterior cruciate ligament: Intact

Medial collateral ligament: Intact.

 

Lateral structures:

*  Iliotibial band: Intact.

*  Lateral collateral ligament: Intact.

*  Biceps femoris tendon: Intact

*  Popliteus tendon attachment: Intact

 

Extensive mechanism:

*  Patellar tendon: Intact

*  Quadriceps tendon: Intact

*  Retinacular structures: Intact

 

Fluid: Small joint effusion. No significant Baker's cyst.

 

Intra-articular bodies: None visualized

 

Joint compartments

*  patellofemoral joint: Moderate chondromalacia of the patella. There is 

a small full-thickness fissure near the median ridge of the lateral facet.

*  medial compartment:Intact

*  lateral compartment:Intact

 

Bones: No significant lesion or acute fracture.

Soft tissue: Edema within the infrapatellar fat.

 

Impression:

1. Moderate chondromalacia of the patella.

2. Edema or scarring of the infrapatellar fat.

 

Electronically signed by: Иван Adams MD (10/25/2018 1:49 PM) [facility]

## 2018-12-19 ENCOUNTER — HOSPITAL ENCOUNTER (OUTPATIENT)
Dept: HOSPITAL 61 - KCIC MRI | Age: 33
Discharge: HOME | End: 2018-12-19
Attending: ORTHOPAEDIC SURGERY
Payer: COMMERCIAL

## 2018-12-19 DIAGNOSIS — M22.41: Primary | ICD-10-CM

## 2018-12-19 PROCEDURE — 73721 MRI JNT OF LWR EXTRE W/O DYE: CPT

## 2018-12-19 NOTE — [facility]
MR of the right knee

 

Indication: Right knee pain, instability. Previous lateral surgical 

release. Injury in October. Continued anteromedial pain.

Comparison: None are available.

Technique: The standard multiplanar sequences are obtained.

 

FINDINGS:

Artifact: No significant image degradation.

 

Medial meniscus:Intact.

Lateral meniscus: Intact.

 

Anterior cruciate ligament: Intact.

Posterior cruciate ligament: Intact

Medial collateral ligament: Intact.

 

Lateral structures:

*  Iliotibial band: Intact.

*  Lateral collateral ligament: Intact.

*  Biceps femoris tendon: Intact

*  Popliteus tendon attachment: Intact

 

Extensive mechanism:

*  Patellar tendon: Intact

*  Quadriceps tendon: Intact

*  Retinacular structures: Intact

 

Fluid: Trace joint effusion. No significant Baker's cyst.

 

Intra-articular bodies: None visualized

 

Joint compartments

*  patellofemoral joint: Chondromalacia of the patella appears similar to 

the prior study. Small linear full-thickness fissure is again identified.

*  medial compartment:Intact

*  lateral compartment:Intact

 

Bones: No significant lesion or acute fracture.

Soft tissue: Scarring/edema within the upper infrapatellar fat, appears 

similar.

 

Impression:

1. Chondromalacia of the patella appears similar.

2. No new meniscal tear or internal derangement.

 

Electronically signed by: Иван Adams MD (12/19/2018 10:09 AM) [facility]

## 2021-10-26 ENCOUNTER — HOSPITAL ENCOUNTER (OUTPATIENT)
Dept: HOSPITAL 63 - MAMMO | Age: 36
End: 2021-10-26
Attending: FAMILY MEDICINE
Payer: MEDICAID

## 2021-10-26 DIAGNOSIS — N60.02: ICD-10-CM

## 2021-10-26 DIAGNOSIS — N60.01: Primary | ICD-10-CM

## 2021-10-26 DIAGNOSIS — N64.89: ICD-10-CM

## 2021-10-26 PROCEDURE — 76642 ULTRASOUND BREAST LIMITED: CPT

## 2021-10-26 PROCEDURE — 77066 DX MAMMO INCL CAD BI: CPT

## 2021-10-26 NOTE — RAD
EXAM: Bilateral digital diagnostic mammogram with tomosynthesis; bilateral breast sonogram.



HISTORY: 36-year-old female presents with a palpable left breast lump.



TECHNIQUE: Full-field digital craniocaudal and mediolateral oblique 2D and 3D tomosynthesis images of
 both breasts are obtained for evaluation. Computer aided detection was applied. Sonographic imaging 
of both breasts targeted to sites of mammographic nodularity and the site of palpable concern was als
o performed.



COMPARISON: None. This is a baseline mammogram.



BREAST PARENCHYMAL DENSITY: Level D - Extremely dense.



FINDINGS: There is a small nodular asymmetry within the posterior 5:00 position of the right breast i
n the craniocaudal projection. There is no convincing correlate in the mediolateral oblique projectio
n. There is no suspicious finding at the 9:00 position of the left breast at the site of reported pal
pable concern. There is no suspicious calcification or architectural distortion.



Sonographic imaging of the right breast demonstrates a 5 mm complicated cyst with internal debris at 
the 5:00 position 4 cm from the nipple, likely corresponding with the mammographic finding of concern
. There is dense breast parenchyma. There are scattered fibrocystic changes. There is no suspicious s
onographic finding.



Sonographic imaging of the left breast demonstrates a 7 mm simple appearing cyst at the 10:00 positio
n 2 cm from the nipple, possibly corresponding with the palpable abnormality of concern. There is no 
suspicious sonographic finding within the surrounding left breast. There is dense breast recommend. T
here are scattered fibrocystic changes. There is no suspicious sonographic finding.



IMPRESSION: 

1. Small bilateral breast cysts, the left of which is located at the 10:00 position and may correspon
d with the palpable abnormality of concern. There is no suspicious mammographic or sonographic findin
g. Continued clinical follow-up of palpable abnormalities is recommended. Negative imaging should not
 preclude the decision to biopsy a palpable abnormality if there is continuing concern.

2. BI-RADS Category 2: Benign finding(s). 



RECOMMENDATION: Bilateral screening mammography resuming age 40, or earlier if deemed indicated based
 on clinical history, is recommended per ACR guidelines.



If your mammogram demonstrates that you have dense breast tissue, which could hide abnormalities, and
 if you have other risk factors for breast cancer that have been identified, you might benefit from s
upplemental screening tests that may be suggested by your ordering physician.  Dense breast tissue, i
n and of itself, is a relatively common condition.  This information is not provided to cause undue c
oncern, but rather to raise your awareness and to promote discussion with your physician regarding th
e presence of other risk factors, in addition to dense breast tissue. A report of your mammography re
sults will be sent to you and your physician.  You should contact your physician if you have any ques
tions or concerns regarding this report.  



Mammography is a sensitive method for finding small breast cancers, but it does not detect them all a
nd is not a substitute for careful clinical examination.  A negative mammogram does not negate a clin
ically suspicious finding and should not result in delay in biopsying a clinically suspicious abnorma
lity.



PQRS compliance statement -  Patient information was entered into a reminder system with a target due
 date for the next mammogram. 



"Our facility is accredited by the [employer] Mammography Program."



Electronically signed by: Julisa Nolan MD (10/26/2021 4:21 PM) [id number]

## 2021-10-26 NOTE — RAD
EXAM: Bilateral digital diagnostic mammogram with tomosynthesis; bilateral breast sonogram.



HISTORY: 36-year-old female presents with a palpable left breast lump.



TECHNIQUE: Full-field digital craniocaudal and mediolateral oblique 2D and 3D tomosynthesis images of
 both breasts are obtained for evaluation. Computer aided detection was applied. Sonographic imaging 
of both breasts targeted to sites of mammographic nodularity and the site of palpable concern was als
o performed.



COMPARISON: None. This is a baseline mammogram.



BREAST PARENCHYMAL DENSITY: Level D - Extremely dense.



FINDINGS: There is a small nodular asymmetry within the posterior 5:00 position of the right breast i
n the craniocaudal projection. There is no convincing correlate in the mediolateral oblique projectio
n. There is no suspicious finding at the 9:00 position of the left breast at the site of reported pal
pable concern. There is no suspicious calcification or architectural distortion.



Sonographic imaging of the right breast demonstrates a 5 mm complicated cyst with internal debris at 
the 5:00 position 4 cm from the nipple, likely corresponding with the mammographic finding of concern
. There is dense breast parenchyma. There are scattered fibrocystic changes. There is no suspicious s
onographic finding.



Sonographic imaging of the left breast demonstrates a 7 mm simple appearing cyst at the 10:00 positio
n 2 cm from the nipple, possibly corresponding with the palpable abnormality of concern. There is no 
suspicious sonographic finding within the surrounding left breast. There is dense breast recommend. T
here are scattered fibrocystic changes. There is no suspicious sonographic finding.



IMPRESSION: 

1. Small bilateral breast cysts, the left of which is located at the 10:00 position and may correspon
d with the palpable abnormality of concern. There is no suspicious mammographic or sonographic findin
g. Continued clinical follow-up of palpable abnormalities is recommended. Negative imaging should not
 preclude the decision to biopsy a palpable abnormality if there is continuing concern.

2. BI-RADS Category 2: Benign finding(s). 



RECOMMENDATION: Bilateral screening mammography resuming age 40, or earlier if deemed indicated based
 on clinical history, is recommended per ACR guidelines.



If your mammogram demonstrates that you have dense breast tissue, which could hide abnormalities, and
 if you have other risk factors for breast cancer that have been identified, you might benefit from s
upplemental screening tests that may be suggested by your ordering physician.  Dense breast tissue, i
n and of itself, is a relatively common condition.  This information is not provided to cause undue c
oncern, but rather to raise your awareness and to promote discussion with your physician regarding th
e presence of other risk factors, in addition to dense breast tissue. A report of your mammography re
sults will be sent to you and your physician.  You should contact your physician if you have any ques
tions or concerns regarding this report.  



Mammography is a sensitive method for finding small breast cancers, but it does not detect them all a
nd is not a substitute for careful clinical examination.  A negative mammogram does not negate a clin
ically suspicious finding and should not result in delay in biopsying a clinically suspicious abnorma
lity.



PQRS compliance statement -  Patient information was entered into a reminder system with a target due
 date for the next mammogram. 



"Our facility is accredited by the [employer] Mammography Program."



Electronically signed by: Julisa Nolan MD (10/26/2021 4:21 PM) [id number]